# Patient Record
Sex: FEMALE | Race: WHITE
[De-identification: names, ages, dates, MRNs, and addresses within clinical notes are randomized per-mention and may not be internally consistent; named-entity substitution may affect disease eponyms.]

---

## 2018-12-27 ENCOUNTER — APPOINTMENT (OUTPATIENT)
Dept: INTERNAL MEDICINE | Facility: CLINIC | Age: 63
End: 2018-12-27
Payer: COMMERCIAL

## 2018-12-27 VITALS
SYSTOLIC BLOOD PRESSURE: 165 MMHG | OXYGEN SATURATION: 97 % | HEART RATE: 68 BPM | TEMPERATURE: 98.3 F | HEIGHT: 68 IN | WEIGHT: 213 LBS | DIASTOLIC BLOOD PRESSURE: 96 MMHG | BODY MASS INDEX: 32.28 KG/M2 | RESPIRATION RATE: 18 BRPM

## 2018-12-27 DIAGNOSIS — Z82.0 FAMILY HISTORY OF EPILEPSY AND OTHER DISEASES OF THE NERVOUS SYSTEM: ICD-10-CM

## 2018-12-27 DIAGNOSIS — Z82.49 FAMILY HISTORY OF ISCHEMIC HEART DISEASE AND OTHER DISEASES OF THE CIRCULATORY SYSTEM: ICD-10-CM

## 2018-12-27 DIAGNOSIS — Z87.891 PERSONAL HISTORY OF NICOTINE DEPENDENCE: ICD-10-CM

## 2018-12-27 DIAGNOSIS — Z84.0 FAMILY HISTORY OF DISEASES OF THE SKIN AND SUBCUTANEOUS TISSUE: ICD-10-CM

## 2018-12-27 PROCEDURE — 99205 OFFICE O/P NEW HI 60 MIN: CPT | Mod: 25

## 2018-12-27 PROCEDURE — 94729 DIFFUSING CAPACITY: CPT

## 2018-12-27 PROCEDURE — 94727 GAS DIL/WSHOT DETER LNG VOL: CPT

## 2018-12-27 PROCEDURE — 94060 EVALUATION OF WHEEZING: CPT

## 2018-12-27 PROCEDURE — ZZZZZ: CPT

## 2018-12-31 PROBLEM — Z82.49 FAMILY HISTORY OF MYOCARDIAL INFARCTION: Status: ACTIVE | Noted: 2018-12-27

## 2018-12-31 PROBLEM — Z82.0 FAMILY HISTORY OF MULTIPLE SCLEROSIS: Status: ACTIVE | Noted: 2018-12-27

## 2018-12-31 PROBLEM — Z84.0 FAMILY HISTORY OF GANGRENE: Status: ACTIVE | Noted: 2018-12-27

## 2018-12-31 PROBLEM — Z82.49 FAMILY HISTORY OF CARDIAC DISORDER: Status: ACTIVE | Noted: 2018-12-27

## 2018-12-31 PROBLEM — Z87.891 FORMER SMOKER: Status: ACTIVE | Noted: 2018-12-27

## 2018-12-31 NOTE — HISTORY OF PRESENT ILLNESS
[FreeTextEntry1] : initial evaluation [de-identified] : Ms. Cardenas presents for an initial evaluation for primary care. She has a history of hypertension which is well-controlled on amlodipine. Patient denies any chest pain or palpitations. She does get some shortness of breath with exertion. 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 She denies any hematuria or dysuria. Ms. Cardenas has no significant history of cardiopulmonary disease. She will be following up with her gynecologist regarding mammogram and bone density testing. There is no recent travel outside the country. She is a nonsmoker.

## 2018-12-31 NOTE — PLAN
[FreeTextEntry1] : Ms. Arce will continue on amlodipine. Her blood pressure is well-controlled. Patient has been given a prescription for comprehensive blood profile. she has been given kit for fecal occult bloodesting and has been referred to Dr. Chamberlain in for a screening colonoscopy as she has not yet had one.Followup in 6 months.

## 2019-02-07 NOTE — HEALTH RISK ASSESSMENT
[Excellent] : ~his/her~  mood as  excellent [No falls in past year] : Patient reported no falls in the past year [0] : 2) Feeling down, depressed, or hopeless: Not at all (0) [None] : None [Fully functional (bathing, dressing, toileting, transferring, walking, feeding)] : Fully functional (bathing, dressing, toileting, transferring, walking, feeding) [Fully functional (using the telephone, shopping, preparing meals, housekeeping, doing laundry, using] : Fully functional and needs no help or supervision to perform IADLs (using the telephone, shopping, preparing meals, housekeeping, doing laundry, using transportation, managing medications and managing finances) [Discussed at today's visit] : Advance Directives Discussed at today's visit [Aggressive treatment] : aggressive treatment [] : No [Change in mental status noted] : No change in mental status noted [Language] : denies difficulty with language [Behavior] : denies difficulty with behavior [Learning/Retaining New Information] : denies difficulty learning/retaining new information [Handling Complex Tasks] : denies difficulty handling complex tasks [Reasoning] : denies difficulty with reasoning [Spatial Ability and Orientation] : denies difficulty with spatial ability and orientation Patient

## 2019-08-08 ENCOUNTER — APPOINTMENT (OUTPATIENT)
Dept: INTERNAL MEDICINE | Facility: CLINIC | Age: 64
End: 2019-08-08

## 2019-10-17 ENCOUNTER — APPOINTMENT (OUTPATIENT)
Dept: INTERNAL MEDICINE | Facility: CLINIC | Age: 64
End: 2019-10-17
Payer: COMMERCIAL

## 2019-10-17 ENCOUNTER — NON-APPOINTMENT (OUTPATIENT)
Age: 64
End: 2019-10-17

## 2019-10-17 VITALS
BODY MASS INDEX: 33.95 KG/M2 | OXYGEN SATURATION: 95 % | DIASTOLIC BLOOD PRESSURE: 102 MMHG | SYSTOLIC BLOOD PRESSURE: 178 MMHG | TEMPERATURE: 98.4 F | WEIGHT: 224 LBS | RESPIRATION RATE: 16 BRPM | HEIGHT: 68 IN | HEART RATE: 72 BPM

## 2019-10-17 VITALS — SYSTOLIC BLOOD PRESSURE: 188 MMHG | DIASTOLIC BLOOD PRESSURE: 100 MMHG

## 2019-10-17 VITALS — SYSTOLIC BLOOD PRESSURE: 220 MMHG | DIASTOLIC BLOOD PRESSURE: 110 MMHG

## 2019-10-17 DIAGNOSIS — H35.032 HYPERTENSIVE RETINOPATHY, LEFT EYE: ICD-10-CM

## 2019-10-17 PROCEDURE — 99214 OFFICE O/P EST MOD 30 MIN: CPT | Mod: 25

## 2019-10-17 PROCEDURE — 93000 ELECTROCARDIOGRAM COMPLETE: CPT

## 2019-10-17 NOTE — PLAN
[FreeTextEntry1] : Patient instructed to go to ER for very high BP and vision loss in left eye. Patient refused to go to ER despite education on risks of stroke, heart attack and/or death.\par Discussed with patient this is an urgent need for ER evaluation. Patient continued to refuse to go to ED.\par \par Urgent referral made to ophthalmologist Dr. Palomino. Patient has appointment at 2:30 today. Spoke with Dr. Palomino's scribe Charu and gave her report. \par \par Amlodipine 5 mg daily refilled. Take as soon as possible\par \par Start Lisinopril 20 mg daily. Take as soon as possible.\par \par FBW to be done today.\par \par CT head without contrast to be done as soon as possible\par \par F/U in 1 week here in [facility] office.\par \par Signs and symptoms of stroke discussed with patient. Advised if she experiences any s/s stroke, chest pain,  SOB, headache, dizziness, presyncope/syncope to go to ER immediately. \par \par Case and plan discussed with Dr. Tucker.\par \par

## 2019-10-17 NOTE — PHYSICAL EXAM
[EOMI] : extraocular movements intact [No Lymphadenopathy] : no lymphadenopathy [No Edema] : there was no peripheral edema [Normal] : normal rate, regular rhythm, normal S1 and S2 and no murmur heard [Grossly Normal Strength/Tone] : grossly normal strength/tone [Coordination Grossly Intact] : coordination grossly intact [No Focal Deficits] : no focal deficits [Normal Gait] : normal gait [Alert and Oriented x3] : oriented to person, place, and time [Speech Grossly Normal] : speech grossly normal [de-identified] : Left pupil is fixed, dilated and nonreactive to light. No redness noted to sclera or drainage noted to both eyes. right eye is 20/20 on Snellen chart. Unable to assess visual acuity for left eye using Snellen chart as patient cannot see. [de-identified] : Normal hand  strength. Full muscle strength b/l. Shoulder shrug intact. Face is symmetric with normal smile. Tongue midline. [de-identified] : Obese

## 2019-10-17 NOTE — HISTORY OF PRESENT ILLNESS
[FreeTextEntry8] : Patient comes in today with complaints of "eye infection" and asking for med refills. Her BP is elevated at this visit. BP rechecked by myself 220/102 left arm, 230/100 in right arm.  Left arm checked again later in visit 190/120. She has been prescribed amlodipine 5 mg daily but ran out of her medication a couple months ago. She denies headache, dizziness, chest pain, SOB, weakness, palpitations, presyncope or any syncopal episodes. She explains that she has white coat syndrome and she feels "fine." She states both eyes had some white drainage. She states she had blurry vision in the left eye in July and it has gotten progressively worse since July. Currently she is unable to see out of her left eye and says she can only sometimes see shadows or light. She sometimes has a "shooting pain" in left eye. She denies any stroke like symptoms in July around beginning of symptoms. She denies any history of ophthalmologic problems or seeing an ophthalmologist recently. She lost her  in January of this year and explains she has been under a lot of stress.

## 2019-10-24 ENCOUNTER — APPOINTMENT (OUTPATIENT)
Dept: INTERNAL MEDICINE | Facility: CLINIC | Age: 64
End: 2019-10-24

## 2019-11-21 ENCOUNTER — RX RENEWAL (OUTPATIENT)
Age: 64
End: 2019-11-21

## 2020-01-02 ENCOUNTER — MEDICATION RENEWAL (OUTPATIENT)
Age: 65
End: 2020-01-02

## 2020-06-01 ENCOUNTER — APPOINTMENT (OUTPATIENT)
Dept: INTERNAL MEDICINE | Facility: CLINIC | Age: 65
End: 2020-06-01
Payer: COMMERCIAL

## 2020-06-01 VITALS
HEIGHT: 68 IN | OXYGEN SATURATION: 97 % | DIASTOLIC BLOOD PRESSURE: 102 MMHG | SYSTOLIC BLOOD PRESSURE: 230 MMHG | RESPIRATION RATE: 16 BRPM | TEMPERATURE: 98.4 F | WEIGHT: 228 LBS | BODY MASS INDEX: 34.56 KG/M2 | HEART RATE: 58 BPM

## 2020-06-01 DIAGNOSIS — R06.00 DYSPNEA, UNSPECIFIED: ICD-10-CM

## 2020-06-01 DIAGNOSIS — Z00.00 ENCOUNTER FOR GENERAL ADULT MEDICAL EXAMINATION W/OUT ABNORMAL FINDINGS: ICD-10-CM

## 2020-06-01 DIAGNOSIS — I16.0 HYPERTENSIVE URGENCY: ICD-10-CM

## 2020-06-01 DIAGNOSIS — I10 ESSENTIAL (PRIMARY) HYPERTENSION: ICD-10-CM

## 2020-06-01 PROCEDURE — 99396 PREV VISIT EST AGE 40-64: CPT

## 2020-06-01 RX ORDER — LISINOPRIL 40 MG/1
40 TABLET ORAL
Qty: 30 | Refills: 5 | Status: ACTIVE | COMMUNITY
Start: 2019-10-17 | End: 1900-01-01

## 2020-06-01 RX ORDER — AMLODIPINE BESYLATE 5 MG/1
5 TABLET ORAL
Refills: 0 | Status: DISCONTINUED | COMMUNITY
End: 2020-06-01

## 2020-06-01 NOTE — PHYSICAL EXAM
[Well Nourished] : well nourished [No Acute Distress] : no acute distress [Normal Sclera/Conjunctiva] : normal sclera/conjunctiva [PERRL] : pupils equal round and reactive to light [Well Developed] : well developed [Well-Appearing] : well-appearing [EOMI] : extraocular movements intact [Normal Outer Ear/Nose] : the outer ears and nose were normal in appearance [Normal Oropharynx] : the oropharynx was normal [No JVD] : no jugular venous distention [No Lymphadenopathy] : no lymphadenopathy [No Respiratory Distress] : no respiratory distress  [Thyroid Normal, No Nodules] : the thyroid was normal and there were no nodules present [Supple] : supple [No Accessory Muscle Use] : no accessory muscle use [Normal Rate] : normal rate  [Clear to Auscultation] : lungs were clear to auscultation bilaterally [No Murmur] : no murmur heard [Regular Rhythm] : with a regular rhythm [Normal S1, S2] : normal S1 and S2 [No Varicosities] : no varicosities [No Abdominal Bruit] : a ~M bruit was not heard ~T in the abdomen [No Carotid Bruits] : no carotid bruits [Pedal Pulses Present] : the pedal pulses are present [No Palpable Aorta] : no palpable aorta [No Edema] : there was no peripheral edema [No Extremity Clubbing/Cyanosis] : no extremity clubbing/cyanosis [Non Tender] : non-tender [Soft] : abdomen soft [Non-distended] : non-distended [No Masses] : no abdominal mass palpated [No HSM] : no HSM [Normal Posterior Cervical Nodes] : no posterior cervical lymphadenopathy [Normal Bowel Sounds] : normal bowel sounds [Normal Anterior Cervical Nodes] : no anterior cervical lymphadenopathy [No CVA Tenderness] : no CVA  tenderness [No Spinal Tenderness] : no spinal tenderness [Grossly Normal Strength/Tone] : grossly normal strength/tone [No Joint Swelling] : no joint swelling [No Rash] : no rash [Coordination Grossly Intact] : coordination grossly intact [Normal Gait] : normal gait [No Focal Deficits] : no focal deficits [Normal Affect] : the affect was normal [Deep Tendon Reflexes (DTR)] : deep tendon reflexes were 2+ and symmetric [Normal Insight/Judgement] : insight and judgment were intact

## 2020-06-01 NOTE — HISTORY OF PRESENT ILLNESS
[FreeTextEntry1] : Annual physical examination [de-identified] : Ms. Cardenas presents for annual physical examination. She has a history of hypertension. She has been taking amlodipine 5 mg daily, however, she ran out of her prescription for lisinopril 20 mg daily. Her blood pressure is 220/100. The patient denies any headache, nausea, vomiting or visual disturbances. She has no chest pain or palpitations. 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

## 2020-06-01 NOTE — HEALTH RISK ASSESSMENT
[No] : In the past 12 months have you used drugs other than those required for medical reasons? No [0] : 2) Feeling down, depressed, or hopeless: Not at all (0) [Employed] : employed [Smoke Detector] : smoke detector [Carbon Monoxide Detector] : carbon monoxide detector [Safety elements used in home] : safety elements used in home [Seat Belt] :  uses seat belt [Sunscreen] : uses sunscreen [] : No [FreeTextEntry2] : nurse [Guns at Home] : no guns at home

## 2020-06-01 NOTE — PLAN
[FreeTextEntry1] : Ms. Cardenas presents for annual physical examination. The pressure significantly elevated. The patient has been off of lisinopril 20 mg for several weeks. She continues on amlodipine 5 mg daily. Patient will be restarted on lisinopril 40 mg daily. She will continue on amlodipine and followup in 6 weeks for blood pressure check. Patient is also given a prescription for comprehensive blood profile as well as prescription for it and mammogram and bone density testing. She's been instructed to go the emergency room for any persistent headache, visual disturbances, nausea and vomiting, chest pain or shortness of breath.

## 2020-07-13 ENCOUNTER — APPOINTMENT (OUTPATIENT)
Dept: INTERNAL MEDICINE | Facility: CLINIC | Age: 65
End: 2020-07-13

## 2021-07-13 ENCOUNTER — RX RENEWAL (OUTPATIENT)
Age: 66
End: 2021-07-13

## 2021-07-13 RX ORDER — AMLODIPINE BESYLATE 5 MG/1
5 TABLET ORAL DAILY
Qty: 90 | Refills: 2 | Status: ACTIVE | COMMUNITY
Start: 2018-12-27 | End: 1900-01-01

## 2021-10-06 PROBLEM — I10 ESSENTIAL HYPERTENSION: Status: ACTIVE | Noted: 2018-12-27

## 2021-10-26 ENCOUNTER — APPOINTMENT (OUTPATIENT)
Dept: INTERNAL MEDICINE | Facility: CLINIC | Age: 66
End: 2021-10-26

## 2022-03-15 ENCOUNTER — APPOINTMENT (OUTPATIENT)
Dept: INTERNAL MEDICINE | Facility: CLINIC | Age: 67
End: 2022-03-15

## 2024-05-15 ENCOUNTER — NON-APPOINTMENT (OUTPATIENT)
Age: 69
End: 2024-05-15

## 2024-05-17 ENCOUNTER — NON-APPOINTMENT (OUTPATIENT)
Age: 69
End: 2024-05-17

## 2024-08-26 ENCOUNTER — APPOINTMENT (OUTPATIENT)
Dept: INTERNAL MEDICINE | Facility: CLINIC | Age: 69
End: 2024-08-26

## 2024-11-08 ENCOUNTER — APPOINTMENT (OUTPATIENT)
Dept: INTERNAL MEDICINE | Facility: CLINIC | Age: 69
End: 2024-11-08
Payer: MEDICARE

## 2024-11-08 VITALS
HEART RATE: 75 BPM | WEIGHT: 220 LBS | TEMPERATURE: 98.1 F | OXYGEN SATURATION: 97 % | SYSTOLIC BLOOD PRESSURE: 210 MMHG | HEIGHT: 68 IN | BODY MASS INDEX: 33.34 KG/M2 | DIASTOLIC BLOOD PRESSURE: 120 MMHG

## 2024-11-08 DIAGNOSIS — H35.032 HYPERTENSIVE RETINOPATHY, LEFT EYE: ICD-10-CM

## 2024-11-08 DIAGNOSIS — Z87.891 PERSONAL HISTORY OF NICOTINE DEPENDENCE: ICD-10-CM

## 2024-11-08 DIAGNOSIS — I16.0 HYPERTENSIVE URGENCY: ICD-10-CM

## 2024-11-08 DIAGNOSIS — I10 ESSENTIAL (PRIMARY) HYPERTENSION: ICD-10-CM

## 2024-11-08 DIAGNOSIS — Z00.00 ENCOUNTER FOR GENERAL ADULT MEDICAL EXAMINATION W/OUT ABNORMAL FINDINGS: ICD-10-CM

## 2024-11-08 DIAGNOSIS — Z23 ENCOUNTER FOR IMMUNIZATION: ICD-10-CM

## 2024-11-08 PROCEDURE — G0008: CPT

## 2024-11-08 PROCEDURE — 99204 OFFICE O/P NEW MOD 45 MIN: CPT

## 2024-11-08 PROCEDURE — G2211 COMPLEX E/M VISIT ADD ON: CPT

## 2024-11-08 PROCEDURE — 90662 IIV NO PRSV INCREASED AG IM: CPT

## 2024-11-08 RX ORDER — AMLODIPINE BESYLATE 5 MG/1
5 TABLET ORAL DAILY
Qty: 30 | Refills: 5 | Status: ACTIVE | COMMUNITY
Start: 2024-11-08 | End: 1900-01-01

## 2024-11-08 RX ORDER — LOSARTAN POTASSIUM 50 MG/1
50 TABLET, FILM COATED ORAL
Qty: 30 | Refills: 5 | Status: ACTIVE | COMMUNITY
Start: 2024-11-08 | End: 1900-01-01

## 2024-11-22 ENCOUNTER — APPOINTMENT (OUTPATIENT)
Dept: INTERNAL MEDICINE | Facility: CLINIC | Age: 69
End: 2024-11-22

## 2025-05-18 NOTE — REVIEW OF SYSTEMS
Otilia Santacruz (:  2004) is a 21 y.o. female,Established patient, here for evaluation of the following chief complaint(s):  Sore Throat (Patient c/o sore throat, cough, congestion and headaches since last office visit)      ASSESSMENT/PLAN:    1. Acute non-recurrent maxillary sinusitis    - amoxicillin-clavulanate (AUGMENTIN) 875-125 MG per tablet; Take 1 tablet by mouth 2 times daily for 7 days  Dispense: 14 tablet; Refill: 0  - brompheniramine-pseudoephedrine-DM 2-30-10 MG/5ML syrup; Take 10 mLs by mouth every 6 hours as needed for Cough or Congestion  Dispense: 118 mL; Refill: 0  -You may take Acetaminophen or Ibuprofen as directed on packaging for fever or discomfort.     2. Pharyngitis, unspecified etiology    - amoxicillin-clavulanate (AUGMENTIN) 875-125 MG per tablet; Take 1 tablet by mouth 2 times daily for 7 days  Dispense: 14 tablet; Refill: 0    3. Sore throat    - Culture, Throat   Will call with results         Return if symptoms worsen or fail to improve.     AVS reviewed. All questions answered.  Instructions were acknowledged and verbalized by the patient.     SUBJECTIVE/OBJECTIVE:  HPI:   21 y.o. female presents alone for complaint of sore throat, cough, sinus congestion and pressure x 4 weeks.    Admits feeling feverish at times.   Denies chills, nausea, vomiting    Cold medications have not been very effective. Laying down at night makes cough worse.     Patient provided the HPI by themself - the patient is considered to be a reliable historian.      History provided by:  Patient      Vitals:    25 1252   BP: 104/74   BP Site: Right Upper Arm   Patient Position: Sitting   BP Cuff Size: Large Adult   Pulse: 99   Resp: 18   Temp: 98.1 °F (36.7 °C)   TempSrc: Temporal   SpO2: 99%   Weight: 66.2 kg (146 lb)   Height: 1.575 m (5' 2\")       Review of Systems   Constitutional: Negative.  Negative for chills and fever.   HENT:  Positive for congestion, facial swelling, sinus pressure,  [Negative] : Heme/Lymph